# Patient Record
Sex: MALE
[De-identification: names, ages, dates, MRNs, and addresses within clinical notes are randomized per-mention and may not be internally consistent; named-entity substitution may affect disease eponyms.]

---

## 2018-01-22 ENCOUNTER — HOSPITAL ENCOUNTER (EMERGENCY)
Dept: HOSPITAL 14 - H.ER | Age: 43
Discharge: HOME | End: 2018-01-22
Payer: COMMERCIAL

## 2018-01-22 VITALS — HEART RATE: 87 BPM | DIASTOLIC BLOOD PRESSURE: 72 MMHG | RESPIRATION RATE: 18 BRPM | SYSTOLIC BLOOD PRESSURE: 122 MMHG

## 2018-01-22 VITALS — OXYGEN SATURATION: 98 %

## 2018-01-22 VITALS — TEMPERATURE: 98.6 F

## 2018-01-22 DIAGNOSIS — J11.1: Primary | ICD-10-CM

## 2018-01-22 LAB
ALBUMIN SERPL-MCNC: 4.3 G/DL (ref 3.5–5)
ALBUMIN/GLOB SERPL: 1.1 {RATIO} (ref 1–2.1)
ALT SERPL-CCNC: 94 U/L (ref 21–72)
AST SERPL-CCNC: 66 U/L (ref 17–59)
BASE EXCESS BLDV CALC-SCNC: 1.2 MMOL/L (ref 0–2)
BASOPHILS # BLD AUTO: 0 K/UL (ref 0–0.2)
BASOPHILS NFR BLD: 0.5 % (ref 0–2)
BUN SERPL-MCNC: 10 MG/DL (ref 9–20)
CALCIUM SERPL-MCNC: 9.2 MG/DL (ref 8.4–10.2)
EOSINOPHIL # BLD AUTO: 0 K/UL (ref 0–0.7)
EOSINOPHIL NFR BLD: 0.1 % (ref 0–4)
ERYTHROCYTE [DISTWIDTH] IN BLOOD BY AUTOMATED COUNT: 14 % (ref 11.5–14.5)
GFR NON-AFRICAN AMERICAN: > 60
HGB BLD-MCNC: 14.4 G/DL (ref 12–18)
LYMPHOCYTES # BLD AUTO: 0.6 K/UL (ref 1–4.3)
LYMPHOCYTES NFR BLD AUTO: 14.5 % (ref 20–40)
MCH RBC QN AUTO: 29.5 PG (ref 27–31)
MCHC RBC AUTO-ENTMCNC: 33.3 G/DL (ref 33–37)
MCV RBC AUTO: 88.5 FL (ref 80–94)
MONOCYTES # BLD: 0.5 K/UL (ref 0–0.8)
MONOCYTES NFR BLD: 12.7 % (ref 0–10)
NEUTROPHILS # BLD: 3.1 K/UL (ref 1.8–7)
NEUTROPHILS NFR BLD AUTO: 72.2 % (ref 50–75)
NRBC BLD AUTO-RTO: 1.6 % (ref 0–0)
PCO2 BLDV: 41 MMHG (ref 40–60)
PH BLDV: 7.41 [PH] (ref 7.32–7.43)
PLATELET # BLD: 204 K/UL (ref 130–400)
PMV BLD AUTO: 7.3 FL (ref 7.2–11.7)
RBC # BLD AUTO: 4.91 MIL/UL (ref 4.4–5.9)
VENOUS BLOOD FIO2: 21 %
VENOUS BLOOD GAS PO2: 59 MM/HG (ref 30–55)
WBC # BLD AUTO: 4.3 K/UL (ref 4.8–10.8)

## 2018-01-22 PROCEDURE — 99284 EMERGENCY DEPT VISIT MOD MDM: CPT

## 2018-01-22 PROCEDURE — 82803 BLOOD GASES ANY COMBINATION: CPT

## 2018-01-22 PROCEDURE — 87040 BLOOD CULTURE FOR BACTERIA: CPT

## 2018-01-22 PROCEDURE — 80053 COMPREHEN METABOLIC PANEL: CPT

## 2018-01-22 PROCEDURE — 87804 INFLUENZA ASSAY W/OPTIC: CPT

## 2018-01-22 PROCEDURE — 85025 COMPLETE CBC W/AUTO DIFF WBC: CPT

## 2018-01-22 PROCEDURE — 71046 X-RAY EXAM CHEST 2 VIEWS: CPT

## 2018-01-22 NOTE — ED PDOC
HPI: General Adult


Time Seen by Provider: 18 16:09


Chief Complaint (Nursing): Flu-like Symptoms


Chief Complaint (Provider): flu like symptoms


History Per: Patient


Additional Complaint(s): 


42-year-old male presents with fever, chills, body aches, cough and sore throat 

that started yesterday. Patient has not taken any medication for 

symptomatically relief. He denies any chest pain or shortness of breath. Patient

's daughter is also being evaluated for the same symptoms.





PMD:  [place]





Past Medical History


Reviewed: Historical Data, Nursing Documentation, Vital Signs


Vital Signs: 


 Last Vital Signs











Temp  103 F H  18 15:21


 


Pulse  120 H  18 15:21


 


Resp  16   18 15:21


 


BP  126/66   18 15:21


 


Pulse Ox  98   18 18:14














- Medical History


PMH: No Chronic Diseases





- Surgical History


Surgical History: No Surg Hx





- Family History


Family History: States: No Known Family Hx





- Living Arrangements


Living Arrangements: With Family





- Social History


Current smoker - smoking cessation education provided: No


Alcohol: None


Drugs: Denies





- Home Medications


Home Medications: 


 Ambulatory Orders











 Medication  Instructions  Recorded


 


Ibuprofen [Motrin] 600 mg PO Q6 PRN #20 tab 06/06/15


 


Oxycodone HCl/Acetaminophen 1 tab PO Q4 #5 tab 06/06/15





[Percocet 325 mg-5 mg]  


 


Benzonatate 200 mg PO TID PRN #20 capsule 18


 


Oseltamivir Phosphate [Tamiflu] 75 mg PO BID #10 capsule 18














- Allergies


Allergies/Adverse Reactions: 


 Allergies











Allergy/AdvReac Type Severity Reaction Status Date / Time


 


No Known Allergies Allergy   Verified 06/06/15 10:52














Review of Systems


ROS Statement: Except As Marked, All Systems Reviewed And Found Negative


Constitutional: Positive for: Fever, Chills, Other (body aches)


Cardiovascular: Negative for: Chest Pain


Respiratory: Positive for: Cough


Gastrointestinal: Negative for: Nausea, Vomiting, Abdominal Pain, Diarrhea


Genitourinary Male: Negative for: Dysuria


Neurological: Positive for: Headache





Physical Exam





- Reviewed


Nursing Documentation Reviewed: Yes


Vital Signs Reviewed: Yes





- Physical Exam


Appears: Positive for: Well, Non-toxic, No Acute Distress


Skin: Positive for: Normal Color.  Negative for: Rash


Eye Exam: Positive for: Normal appearance


ENT: Positive for: Pharyngeal Erythema


Cardiovascular/Chest: Positive for: Regular Rate, Rhythm


Respiratory: Positive for: Normal Breath Sounds.  Negative for: Wheezing, 

Respiratory Distress


Gastrointestinal/Abdominal: Positive for: Soft.  Negative for: Tenderness


Neurologic/Psych: Positive for: Alert, Oriented





- Laboratory Results


Result Diagrams: 


 18 17:57





 18 17:57





- ECG


O2 Sat by Pulse Oximetry: 98


Pulse Ox Interpretation: Normal





- Other Rad


  ** CXR


X-Ray: Interpreted by Me, Viewed By Me


X-Ray Interpretation: no acute finding





Medical Decision Making


Medical Decision Makin year old with flu like symptoms, temp of 103 upon arrival





Plan:


CBC


CMP


Blood cultures


Rapid strep/throat culture


Flu swab


CXR


IVF


PO motrin and tylenol


VBG





Patient feels better after meds administered. He is aware of all diagnostic 

testing results. Flu B is positive. Prescriptions provided for Tamiflu and 

Tessalon Perles. Fever control instructions given. Advise follow-up with 

primary doctor in 1-2 days.  Repeat vitals prior to discharge marked improved.





Disposition





- Clinical Impression


Clinical Impression: 


 Influenza








- Patient ED Disposition


Is Patient to be Admitted: No


Counseled Patient/Family Regarding: Studies Performed, Diagnosis, Need For 

Followup, Rx Given





- Disposition


Referrals: 


[facility] [Provider Group]


Disposition: Routine/Home


Disposition Time: 19:22


Condition: STABLE


Additional Instructions: 


Alternate Tylenol every 4 hours and Motrin every 6 hours for fever control and 

body aches. Take prescription meds as directed.  Rest and drink plenty of 

fluids. Follow-up with primary doctor in 2-3 days.


Prescriptions: 


Benzonatate 200 mg PO TID PRN #20 capsule


 PRN Reason: Cough


Oseltamivir Phosphate [Tamiflu] 75 mg PO BID #10 capsule


Instructions:  Influenza (ED)


Forms:  [facility] (English), [facility] ED School/Work Excuse





Results





- Lab Results


Lab Results: 

















  18





  17:57 17:57 17:57


 


WBC    4.3 L


 


RBC    4.91


 


Hgb    14.4


 


Hct    43.4


 


MCV    88.5


 


MCH    29.5


 


MCHC    33.3


 


RDW    14.0


 


Plt Count    204


 


MPV    7.3


 


Neut % (Auto)    72.2


 


Lymph % (Auto)    14.5 L


 


Mono % (Auto)    12.7 H


 


Eos % (Auto)    0.1


 


Baso % (Auto)    0.5


 


Neut #    3.1


 


Lymph #    0.6 L


 


Mono #    0.5


 


Eos #    0.0


 


Baso #    0.0


 


pO2   


 


VBG pH   


 


VBG pCO2   


 


VBG HCO3   


 


VBG Total CO2   


 


VBG O2 Sat (Calc)   


 


VBG Base Excess   


 


VBG Potassium   


 


Sodium   137 


 


Chloride   98 


 


Glucose   


 


Lactate   


 


FiO2   


 


Potassium   3.6 


 


Carbon Dioxide   27 


 


Anion Gap   16 


 


BUN   10 


 


Creatinine   1.1 


 


Est GFR ( Amer)   > 60 


 


Est GFR (Non-Af Amer)   > 60 


 


Random Glucose   112 H 


 


Calcium   9.2 


 


Total Bilirubin   0.3 


 


AST   66 H 


 


ALT   94 H 


 


Alkaline Phosphatase   87 


 


Total Protein   8.4 H 


 


Albumin   4.3 


 


Globulin   4.1 H 


 


Albumin/Globulin Ratio   1.1 


 


Venous Blood Potassium   


 


Influenza Typ A,B (EIA)  Pos for influenza b H  














  18





  17:50


 


WBC 


 


RBC 


 


Hgb 


 


Hct 


 


MCV 


 


MCH 


 


MCHC 


 


RDW 


 


Plt Count 


 


MPV 


 


Neut % (Auto) 


 


Lymph % (Auto) 


 


Mono % (Auto) 


 


Eos % (Auto) 


 


Baso % (Auto) 


 


Neut # 


 


Lymph # 


 


Mono # 


 


Eos # 


 


Baso # 


 


pO2  59 H


 


VBG pH  7.41


 


VBG pCO2  41


 


VBG HCO3  25.7


 


VBG Total CO2  27.3


 


VBG O2 Sat (Calc)  96.3 H


 


VBG Base Excess  1.2


 


VBG Potassium  3.3 L


 


Sodium  134.0


 


Chloride  98.0


 


Glucose  111 H


 


Lactate  1.9


 


FiO2  21.0


 


Potassium 


 


Carbon Dioxide 


 


Anion Gap 


 


BUN 


 


Creatinine 


 


Est GFR ( Amer) 


 


Est GFR (Non-Af Amer) 


 


Random Glucose 


 


Calcium 


 


Total Bilirubin 


 


AST 


 


ALT 


 


Alkaline Phosphatase 


 


Total Protein 


 


Albumin 


 


Globulin 


 


Albumin/Globulin Ratio 


 


Venous Blood Potassium  3.3 L


 


Influenza Typ A,B (EIA)

## 2018-09-09 ENCOUNTER — HOSPITAL ENCOUNTER (EMERGENCY)
Dept: HOSPITAL 14 - H.ER | Age: 43
Discharge: HOME | End: 2018-09-09
Payer: COMMERCIAL

## 2018-09-09 VITALS
HEART RATE: 96 BPM | DIASTOLIC BLOOD PRESSURE: 73 MMHG | TEMPERATURE: 98.1 F | SYSTOLIC BLOOD PRESSURE: 116 MMHG | RESPIRATION RATE: 16 BRPM

## 2018-09-09 VITALS — OXYGEN SATURATION: 97 %
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
## 2018-09-09 DIAGNOSIS — S80.01XA: ICD-10-CM
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
## 2018-09-09 DIAGNOSIS — S86.912A: Primary | ICD-10-CM

## 2018-09-09 NOTE — ED PDOC
Lower Extremity Pain/Injury


Time Seen by Provider: 18 20:40


Chief Complaint (Nursing): Lower Extremity Problem/Injury


Chief Complaint (Provider): Lower Extremity Problem/Injury


History Per: Patient


History/Exam Limitations: no limitations


Onset/Duration Of Symptoms: Hrs (x 2)


Current Symptoms Are (Timing): Still Present


Additional Complaint(s): 


43 year old male with a history of knee problems presents to the ED with 

bilateral knee pain. Patient works as a . During arrest, he fell 

to the ground on his bilateral knees. He noted pain and discomfort 1 hour 

after. Declined Motrin. 





PMD: none provided











- Knee


Description Of Injury: Fell





Past Medical History


Reviewed: Historical Data, Nursing Documentation, Vital Signs


Vital Signs: 





 Last Vital Signs











Temp  98.2 F   18 20:24


 


Pulse  100 H  18 20:24


 


Resp  18   18 20:24


 


BP  124/86   18 20:24


 


Pulse Ox  97   18 20:24














- Medical History


PMH: No Chronic Diseases


Other PMH: chronic knee problems





- Surgical History


Surgical History: No Surg Hx





- Family History


Family History: States: Unknown Family Hx





- Home Medications


Home Medications: 


 Ambulatory Orders











 Medication  Instructions  Recorded


 


Ibuprofen [Motrin] 600 mg PO Q6 PRN #20 tab 06/06/15


 


Oxycodone HCl/Acetaminophen 1 tab PO Q4 #5 tab 06/06/15





[Percocet 325 mg-5 mg]  


 


Benzonatate 200 mg PO TID PRN #20 capsule 18


 


Oseltamivir Phosphate [Tamiflu] 75 mg PO BID #10 capsule 18


 


Ibuprofen [Motrin] 600 mg PO Q8 PRN #21 tab 18














- Allergies


Allergies/Adverse Reactions: 


 Allergies











Allergy/AdvReac Type Severity Reaction Status Date / Time


 


No Known Allergies Allergy   Verified 06/06/15 10:52














Review of Systems


ROS Statement: Except As Marked, All Systems Reviewed And Found Negative


Musculoskeletal: Positive for: Leg Pain (knee pain)





Physical Exam





- Reviewed


Nursing Documentation Reviewed: Yes


Vital Signs Reviewed: Yes





- Physical Exam


Appears: Positive for: Non-toxic, No Acute Distress


Head Exam: Positive for: ATRAUMATIC, NORMAL INSPECTION, NORMOCEPHALIC


Skin: Positive for: Normal Color, Warm, Dry


Eye Exam: Positive for: EOMI, Normal appearance, PERRL


Neck: Positive for: Normal, Painless ROM, Supple


Cardiovascular/Chest: Positive for: Regular Rate, Rhythm.  Negative for: Murmur


Respiratory: Positive for: Normal Breath Sounds.  Negative for: Wheezing, 

Respiratory Distress


Gastrointestinal/Abdominal: Positive for: Normal Exam, Soft.  Negative for: 

Tenderness


Extremity: Positive for: Tenderness (noted medially on left knee), Other (

contusion to anterior right knee).  Negative for: Swelling


Neurologic/Psych: Positive for: Alert, Oriented (x 3).  Negative for: Motor/

Sensory Deficits





- ECG


O2 Sat by Pulse Oximetry: 97 (RA)


Pulse Ox Interpretation: Normal





- Progress


ED Course And Treament: 





XRY OF BILATERAL KNEE: NEG FOR FX











Medical Decision Making


Medical Decision Makin:45 


Plan: 


--Bilateral knee x-rays 

















--------------------------------------------------------------------------------

-----------------


Scribe Attestation:


Documented by Fanny Murillo, acting as a scribe for Benjamin Morales PA-C





Provider Scribe Attestation:


All medical record entries made by the Scribe were at my direction and 

personally dictated by me. I have reviewed the chart and agree that the record 

accurately reflects my personal performance of the history, physical exam, 

medical decision making, and the department course for this patient. I have 

also personally directed, reviewed, and agree with the discharge instructions 

and disposition.





Disposition





- Clinical Impression


Clinical Impression: 


 Strain of left knee, Contusion of knee, right








- Patient ED Disposition


Is Patient to be Admitted: No





- Disposition


Disposition: Routine/Home


Disposition Time: 21:44


Condition: FAIR


Prescriptions: 


Ibuprofen [Motrin] 600 mg PO Q8 PRN #21 tab


 PRN Reason: Pain, Moderate (4-7)


Instructions:  Contusion (DC), Knee Sprain (DC)


Forms:  [facility] ED School/Work Excuse

## 2018-09-10 NOTE — RAD
Date of service: 



09/09/2018



PROCEDURE:  Bilateral Knee Radiographs.



HISTORY:

BILATERAL KNEE PAIN AFTER FALL



COMPARISON:

None.



FINDINGS:



BONES:

No acute fracture or destructive bony lesion identified, bilateral 

knees.



JOINTS:

Right Knee:  Normal. No osteoarthritis. 



Left knee: Normal. No osteoarthritis. 



SOFT TISSUES:

Right Knee: Normal.



Left Knee: Normal.



JOINT EFFUSION:

Right Knee: None. 



Left Knee: None.



OTHER FINDINGS:

None.



IMPRESSION:

Normal radiographs of the knees.